# Patient Record
Sex: MALE | Race: WHITE | NOT HISPANIC OR LATINO | URBAN - METROPOLITAN AREA
[De-identification: names, ages, dates, MRNs, and addresses within clinical notes are randomized per-mention and may not be internally consistent; named-entity substitution may affect disease eponyms.]

---

## 2022-06-24 ENCOUNTER — CLAIMS CREATED FROM THE CLAIM WINDOW (OUTPATIENT)
Dept: URBAN - METROPOLITAN AREA CLINIC 54 | Facility: CLINIC | Age: 57
End: 2022-06-24
Payer: COMMERCIAL

## 2022-06-24 ENCOUNTER — OFFICE VISIT (OUTPATIENT)
Dept: URBAN - METROPOLITAN AREA CLINIC 54 | Facility: CLINIC | Age: 57
End: 2022-06-24

## 2022-06-24 ENCOUNTER — CLAIMS CREATED FROM THE CLAIM WINDOW (OUTPATIENT)
Dept: URBAN - METROPOLITAN AREA CLINIC 54 | Facility: CLINIC | Age: 57
End: 2022-06-24

## 2022-06-24 ENCOUNTER — WEB ENCOUNTER (OUTPATIENT)
Dept: URBAN - METROPOLITAN AREA CLINIC 54 | Facility: CLINIC | Age: 57
End: 2022-06-24

## 2022-06-24 VITALS
SYSTOLIC BLOOD PRESSURE: 115 MMHG | BODY MASS INDEX: 32.56 KG/M2 | HEIGHT: 71 IN | WEIGHT: 232.6 LBS | TEMPERATURE: 97.2 F | DIASTOLIC BLOOD PRESSURE: 78 MMHG | HEART RATE: 86 BPM

## 2022-06-24 DIAGNOSIS — K50.00 CROHN'S DISEASE OF ILEUM WITHOUT COMPLICATION: ICD-10-CM

## 2022-06-24 PROCEDURE — 99203 OFFICE O/P NEW LOW 30 MIN: CPT | Performed by: STUDENT IN AN ORGANIZED HEALTH CARE EDUCATION/TRAINING PROGRAM

## 2022-06-24 RX ORDER — ALPRAZOLAM 0.5 MG/1
1 TABLET TABLET ORAL TWICE A DAY
Status: ON HOLD | COMMUNITY

## 2022-06-24 RX ORDER — SILDENAFIL CITRATE 100 MG/1
1 TABLET AS NEEDED TABLET, FILM COATED ORAL AS NEEDED
Status: ON HOLD | COMMUNITY

## 2022-06-24 RX ORDER — ASCORBIC ACID 1000 MG
1 TABLET TABLET ORAL ONCE A DAY
Status: ON HOLD | COMMUNITY

## 2022-06-24 RX ORDER — B-COMPLEX WITH VITAMIN C
1 TABLET TABLET ORAL ONCE A DAY
Status: ON HOLD | COMMUNITY

## 2022-06-24 RX ORDER — SODIUM, POTASSIUM,MAG SULFATES 17.5-3.13G
177 ML SOLUTION, RECONSTITUTED, ORAL ORAL AS DIRECTED
Qty: 177 MILLILITER | Refills: 0 | OUTPATIENT
Start: 2022-06-24 | End: 2022-06-25

## 2022-06-24 NOTE — EXAM-PHYSICAL EXAM
General: Well appearing. No acute distress. HEENT: Anicteric sclerae Cardiovascular: Normal heart rate Respiratory: Breathing comfortably without conversational dyspnea Abdomen:  non-distended, soft, non-tender, surgical scars Rectal: Deferred Skin: without visible rashes on examined areas. Musculoskeletal: ambulated without difficulty. Can stand from sitting position without assistance. Neuro: No gross focal deficits. Alert and oriented. Psych: Appropriate mood and affect.

## 2022-06-24 NOTE — HPI-TODAY'S VISIT:
Mr. Apolinar Mast is a 56-year-old man with a history of Crohn's disease and pulmonary embolus referred by Almaz Orellana, BLADE, FNP-C for Crohn's disease and colon cancer screening.  Patient's Crohn's diagnosis seems to be predicated on colonoscopy done in 2010 for abdominal pain which was significant for shallow ulcers in the ileum with path showing chronic ileitis.  Patient states he was subsequently started on Lialda.  Follow-up colonoscopy in 2011 without any active disease observed endoscopically or histologically.  Patient states he subsequently weaned off Lialda and was medication free from [id number].  In 2017 he had an episode of diverticulitis complicated by abscess with subsequent segmental resection of the sigmoid colon.  Per patient, at the time of the surgery, surgeon saw that his right colon also showed disease and so a right hemicolectomy was performed (operative report not available at this time). Pathology report from that operation showed... -Sigmoid colon:Diverticulitis with rupture and pericolonic abscess formation.  No evidence of chronic colitis.  No evidence of granuloma, dysplasia or neoplasm. - Ileum with chronic, focally active enteritis.  Per pathology report, these findings were consistent with clinical history of Crohn's disease.  -No evidence of chronic colitis mentioned in pathology from resected right colon.  Per patient, he had follow-up colonoscopy in 2018 after the surgery.  He states that the results were "normal".  No records available at time of interview.  Patient states he feels very well.  He denies any abdominal pain or liquid stools or abdominal mass or arthralgias, or skin rashes, or fistulas. Does endorse lip blisters which he treats with valacyclovir.  Denies any aphthous ulcers.  He is currently not taking any medication for Crohn's disease.

## 2022-06-28 LAB
25-HYDROXY, VITAMIN D-2: <1
25-HYDROXY, VITAMIN D-3: 42
25-HYDROXY, VITAMIN D: 42
FOLATE (FOLIC ACID), SERUM: 8.7
HEMATOCRIT: (no result)
HEMOGLOBIN: (no result)
IRON BIND.CAP.(TIBC): 320
IRON SATURATION: 27
IRON: 86
MCH: (no result)
MCHC: (no result)
MCV: (no result)
NRBC: (no result)
PLATELETS: (no result)
RBC: (no result)
RDW: (no result)
REQUEST PROBLEM: (no result)
UIBC: 234
VITAMIN B12: 535
WBC: (no result)

## 2022-07-12 ENCOUNTER — LAB OUTSIDE AN ENCOUNTER (OUTPATIENT)
Dept: URBAN - METROPOLITAN AREA CLINIC 54 | Facility: CLINIC | Age: 57
End: 2022-07-12

## 2022-07-13 LAB
25-HYDROXY, VITAMIN D-2: (no result)
25-HYDROXY, VITAMIN D-3: (no result)
25-HYDROXY, VITAMIN D: (no result)
CALPROTECTIN, FECAL: (no result)
FOLATE (FOLIC ACID), SERUM: (no result)
HEMATOCRIT: (no result)
HEMOGLOBIN: (no result)
MCH: (no result)
MCHC: (no result)
MCV: (no result)
NRBC: (no result)
PLATELETS: (no result)
RBC: (no result)
RDW: (no result)
REQUEST PROBLEM: (no result)
VITAMIN B12: (no result)
WBC: (no result)

## 2022-07-21 ENCOUNTER — CLAIMS CREATED FROM THE CLAIM WINDOW (OUTPATIENT)
Dept: URBAN - METROPOLITAN AREA CLINIC 4 | Facility: CLINIC | Age: 57
End: 2022-07-21
Payer: COMMERCIAL

## 2022-07-21 ENCOUNTER — OFFICE VISIT (OUTPATIENT)
Dept: URBAN - METROPOLITAN AREA SURGERY CENTER 14 | Facility: SURGERY CENTER | Age: 57
End: 2022-07-21

## 2022-07-21 ENCOUNTER — CLAIMS CREATED FROM THE CLAIM WINDOW (OUTPATIENT)
Dept: URBAN - METROPOLITAN AREA SURGERY CENTER 14 | Facility: SURGERY CENTER | Age: 57
End: 2022-07-21
Payer: COMMERCIAL

## 2022-07-21 DIAGNOSIS — K63.89 OTHER SPECIFIED DISEASES OF INTESTINE: ICD-10-CM

## 2022-07-21 DIAGNOSIS — K63.89 BACTERIAL OVERGROWTH SYNDROME: ICD-10-CM

## 2022-07-21 DIAGNOSIS — K50.00 CICATRIZING ENTEROCOLITIS: ICD-10-CM

## 2022-07-21 PROCEDURE — 88305 TISSUE EXAM BY PATHOLOGIST: CPT | Performed by: PATHOLOGY

## 2022-07-21 PROCEDURE — G8907 PT DOC NO EVENTS ON DISCHARG: HCPCS | Performed by: STUDENT IN AN ORGANIZED HEALTH CARE EDUCATION/TRAINING PROGRAM

## 2022-07-21 PROCEDURE — 45380 COLONOSCOPY AND BIOPSY: CPT | Performed by: STUDENT IN AN ORGANIZED HEALTH CARE EDUCATION/TRAINING PROGRAM

## 2022-07-21 RX ORDER — ALPRAZOLAM 0.5 MG/1
1 TABLET TABLET ORAL TWICE A DAY
Status: ON HOLD | COMMUNITY

## 2022-07-21 RX ORDER — ASCORBIC ACID 1000 MG
1 TABLET TABLET ORAL ONCE A DAY
Status: ON HOLD | COMMUNITY

## 2022-07-21 RX ORDER — SILDENAFIL CITRATE 100 MG/1
1 TABLET AS NEEDED TABLET, FILM COATED ORAL AS NEEDED
Status: ON HOLD | COMMUNITY

## 2022-07-21 RX ORDER — B-COMPLEX WITH VITAMIN C
1 TABLET TABLET ORAL ONCE A DAY
Status: ON HOLD | COMMUNITY

## 2022-08-09 ENCOUNTER — DASHBOARD ENCOUNTERS (OUTPATIENT)
Age: 57
End: 2022-08-09

## 2022-08-09 ENCOUNTER — OFFICE VISIT (OUTPATIENT)
Dept: URBAN - METROPOLITAN AREA CLINIC 54 | Facility: CLINIC | Age: 57
End: 2022-08-09
Payer: COMMERCIAL

## 2022-08-09 VITALS
BODY MASS INDEX: 32.48 KG/M2 | DIASTOLIC BLOOD PRESSURE: 89 MMHG | SYSTOLIC BLOOD PRESSURE: 137 MMHG | WEIGHT: 232 LBS | HEART RATE: 93 BPM | HEIGHT: 71 IN

## 2022-08-09 DIAGNOSIS — K50.00 CROHN'S DISEASE OF ILEUM WITHOUT COMPLICATION: ICD-10-CM

## 2022-08-09 DIAGNOSIS — Z12.11 COLON CANCER SCREENING: ICD-10-CM

## 2022-08-09 PROBLEM — 38106008: Status: ACTIVE | Noted: 2022-06-24

## 2022-08-09 PROCEDURE — 99213 OFFICE O/P EST LOW 20 MIN: CPT | Performed by: STUDENT IN AN ORGANIZED HEALTH CARE EDUCATION/TRAINING PROGRAM

## 2022-08-09 NOTE — EXAM-PHYSICAL EXAM
General: Well appearing. No acute distress. HEENT: Anicteric sclerae Cardiovascular: Normal heart rate Respiratory: Breathing comfortably without conversational dyspnea Abdomen: Non-distended. Rectal: Deferred Skin: without visible rashes on examined areas. Musculoskeletal: Can stand from sitting position without assistance. Neuro: No gross focal deficits. Alert and oriented. Psych: Appropriate mood and affect.

## 2022-08-09 NOTE — HPI-TODAY'S VISIT:
Patient returns today to discuss the results of his ileocolonoscopy.  Patient had ileocolonoscopy on 7/21/2022 for history of Crohn's disease of the small intestine and colorectal cancer screening.  The exam was significant for nonulcerated stenosis in the ileum, 2 cm from the ileocolonic anastomosis and a patent end-to-end colocolonic anastomosis characterized by healthy tissue.  Biopsies of the taylor terminal ileum, neoterminal ileum stenosis and colon were unremarkable. Patient remained symptom-free with no complaints today.

## 2022-08-09 NOTE — HPI-OTHER HISTORIES
6/24/2022  Mr. Apolinar Mast is a 56-year-old man with a history of Crohn's disease and pulmonary embolus referred by Almaz Orellana, BLADE, FNP-C for Crohn's disease and colon cancer screening.  Patient's Crohn's diagnosis seems to be predicated on colonoscopy done in 2010 for abdominal pain which was significant for shallow ulcers in the ileum with path showing chronic ileitis.  Patient states he was subsequently started on Lialda.  Follow-up colonoscopy in 2011 without any active disease observed endoscopically or histologically.  Patient states he subsequently weaned off Lialda and was medication free from [id number].  In 2017 he had an episode of diverticulitis complicated by abscess with subsequent segmental resection of the sigmoid colon.  Per patient, at the time of the surgery, surgeon saw that his right colon also showed disease and so a right hemicolectomy was performed (operative report not available at this time). Pathology report from that operation showed... -Sigmoid colon:Diverticulitis with rupture and pericolonic abscess formation.  No evidence of chronic colitis.  No evidence of granuloma, dysplasia or neoplasm. - Ileum with chronic, focally active enteritis.  Per pathology report, these findings were consistent with clinical history of Crohn's disease.  -No evidence of chronic colitis mentioned in pathology from resected right colon.  Per patient, he had follow-up colonoscopy in 2018 after the surgery.  He states that the results were "normal".  No records available at time of interview.  Patient states he feels very well.  He denies any abdominal pain or liquid stools or abdominal mass or arthralgias, or skin rashes, or fistulas. Does endorse lip blisters which he treats with valacyclovir.  Denies any aphthous ulcers.  He is currently not taking any medication for Crohn's disease.